# Patient Record
Sex: MALE | Race: BLACK OR AFRICAN AMERICAN | NOT HISPANIC OR LATINO | Employment: FULL TIME | URBAN - METROPOLITAN AREA
[De-identification: names, ages, dates, MRNs, and addresses within clinical notes are randomized per-mention and may not be internally consistent; named-entity substitution may affect disease eponyms.]

---

## 2023-08-15 DIAGNOSIS — I10 HYPERTENSION, UNSPECIFIED TYPE: ICD-10-CM

## 2023-08-19 RX ORDER — LOSARTAN POTASSIUM AND HYDROCHLOROTHIAZIDE 25; 100 MG/1; MG/1
1 TABLET ORAL DAILY
Qty: 90 TABLET | Refills: 2 | Status: SHIPPED | OUTPATIENT
Start: 2023-08-19

## 2024-06-27 ENCOUNTER — PATIENT MESSAGE (OUTPATIENT)
Dept: PRIMARY CARE | Facility: CLINIC | Age: 49
End: 2024-06-27

## 2024-06-27 ENCOUNTER — APPOINTMENT (OUTPATIENT)
Dept: PRIMARY CARE | Facility: CLINIC | Age: 49
End: 2024-06-27
Payer: COMMERCIAL

## 2024-06-27 VITALS
WEIGHT: 315 LBS | TEMPERATURE: 98.2 F | SYSTOLIC BLOOD PRESSURE: 143 MMHG | HEIGHT: 65 IN | DIASTOLIC BLOOD PRESSURE: 91 MMHG | HEART RATE: 88 BPM | BODY MASS INDEX: 52.48 KG/M2

## 2024-06-27 DIAGNOSIS — K21.9 GERD WITHOUT ESOPHAGITIS: ICD-10-CM

## 2024-06-27 DIAGNOSIS — E66.01 CLASS 3 SEVERE OBESITY DUE TO EXCESS CALORIES WITH SERIOUS COMORBIDITY AND BODY MASS INDEX (BMI) OF 50.0 TO 59.9 IN ADULT (MULTI): ICD-10-CM

## 2024-06-27 DIAGNOSIS — N52.9 ERECTILE DYSFUNCTION, UNSPECIFIED ERECTILE DYSFUNCTION TYPE: Primary | ICD-10-CM

## 2024-06-27 DIAGNOSIS — I10 HYPERTENSION, UNSPECIFIED TYPE: ICD-10-CM

## 2024-06-27 DIAGNOSIS — N52.9 ERECTILE DYSFUNCTION, UNSPECIFIED ERECTILE DYSFUNCTION TYPE: ICD-10-CM

## 2024-06-27 DIAGNOSIS — R73.03 PREDIABETES: ICD-10-CM

## 2024-06-27 DIAGNOSIS — Z12.11 ENCOUNTER FOR SCREENING FOR MALIGNANT NEOPLASM OF COLON: ICD-10-CM

## 2024-06-27 DIAGNOSIS — Z00.00 HEALTHCARE MAINTENANCE: Primary | ICD-10-CM

## 2024-06-27 PROBLEM — R29.818 SUSPECTED SLEEP APNEA: Status: RESOLVED | Noted: 2024-06-27 | Resolved: 2024-06-27

## 2024-06-27 LAB — POC HEMOGLOBIN A1C: 5.9 % (ref 4.2–6.5)

## 2024-06-27 PROCEDURE — 3080F DIAST BP >= 90 MM HG: CPT | Performed by: STUDENT IN AN ORGANIZED HEALTH CARE EDUCATION/TRAINING PROGRAM

## 2024-06-27 PROCEDURE — 83036 HEMOGLOBIN GLYCOSYLATED A1C: CPT | Performed by: STUDENT IN AN ORGANIZED HEALTH CARE EDUCATION/TRAINING PROGRAM

## 2024-06-27 PROCEDURE — 99396 PREV VISIT EST AGE 40-64: CPT | Performed by: STUDENT IN AN ORGANIZED HEALTH CARE EDUCATION/TRAINING PROGRAM

## 2024-06-27 PROCEDURE — 1036F TOBACCO NON-USER: CPT | Performed by: STUDENT IN AN ORGANIZED HEALTH CARE EDUCATION/TRAINING PROGRAM

## 2024-06-27 PROCEDURE — 99214 OFFICE O/P EST MOD 30 MIN: CPT | Performed by: STUDENT IN AN ORGANIZED HEALTH CARE EDUCATION/TRAINING PROGRAM

## 2024-06-27 PROCEDURE — 3077F SYST BP >= 140 MM HG: CPT | Performed by: STUDENT IN AN ORGANIZED HEALTH CARE EDUCATION/TRAINING PROGRAM

## 2024-06-27 PROCEDURE — 3008F BODY MASS INDEX DOCD: CPT | Performed by: STUDENT IN AN ORGANIZED HEALTH CARE EDUCATION/TRAINING PROGRAM

## 2024-06-27 RX ORDER — SILDENAFIL 50 MG/1
50 TABLET, FILM COATED ORAL DAILY PRN
Qty: 12 TABLET | Refills: 3 | Status: SHIPPED | OUTPATIENT
Start: 2024-06-27 | End: 2025-06-27

## 2024-06-27 RX ORDER — LOSARTAN POTASSIUM AND HYDROCHLOROTHIAZIDE 25; 100 MG/1; MG/1
1 TABLET ORAL DAILY
Qty: 90 TABLET | Refills: 3 | Status: SHIPPED | OUTPATIENT
Start: 2024-06-27 | End: 2025-06-27

## 2024-06-27 RX ORDER — TADALAFIL 10 MG/1
10 TABLET ORAL DAILY PRN
Qty: 12 TABLET | Refills: 3 | Status: SHIPPED | OUTPATIENT
Start: 2024-06-27 | End: 2025-06-27

## 2024-06-27 RX ORDER — AMLODIPINE BESYLATE 5 MG/1
5 TABLET ORAL DAILY
Qty: 90 TABLET | Refills: 3 | Status: SHIPPED | OUTPATIENT
Start: 2024-06-27 | End: 2025-06-27

## 2024-06-27 NOTE — PATIENT INSTRUCTIONS
Please stop at the lab ([address]) to complete your blood and/or urine work that I've ordered for you.    I will contact you with the results at my soonest convenience. I strongly urge you to use [employer] as this is the quickest and easiest way to access your results and receive my correspondences.    I have added or changed your medications today. See the medication section of this packet for full details.      I recommend that you lose weight via lifestyle modifications such as diet and exercise.     I am referring you to our [facility] to see if you qualify for weight loss medications.      I have ordered a screening colonoscopy. Please call [phone] to schedule the appointment. I recommend being the first appointment of the day, if possible.

## 2024-06-27 NOTE — PROGRESS NOTES
Subjective   Patient ID: Patricia Patton is a 49 y.o. male who presents for No chief complaint on file..    HPI   Hasn't been in for ~2 years. Working for  [employer].    Re: morbid obesity, PreDM - his biggest complaint today. He's been trying to eat better and exercise and he feels that he can't lose weight. Would be interested in weight loss medications if he were to qualify. He is contemplative about bariatric surgery; had some family that had mixed results. Furthermore he is worried about taking time off of work for recovery.     Re: HTN -  BP not at goal. Reports no sx high or low from HTN; denies blurry vision, HA, dizziness LoC CP SoB Jacob and leg swelling     Re: ED - erections not as firm as they used to be. Requesting Viagra.     Re: GERD - longstanding diagnosis, worst at night; position when lying down makes it worse.     PMHx, FHx, Social Hx, Surg Hx personally reviewed at this appointment. No pertinent findings and/or changes from prior (if applicable).    ROS: Denies wt gain/loss f/c HA LoC CP SOB NVDC. See HPI above, and scanned sheet (if applicable). All other systems are reviewed and are without complaint.         Review of Systems    Objective   BP (!) 143/91   Pulse 88   Temp 36.8 °C (98.2 °F)   Wt (!) 151 kg (333 lb)   BMI 53.75 kg/m²     Physical Exam  Gen: morbidly obese, NAD. AAO x3.  HEENT: NC/AT. Anicteric sclera, symmetric pupils. MMM no thrush.  Neck: Soft, supple. No LAD. No goiter.  CV: RRR nl s1s2 no m/r/g  Pulm: CTAB no w/r/r, good air exchange  GI: obese, soft NTND BS+ no hsm  Ext: WWP no edema  Neuro: II-XII grossly intact, nonfocal systemic findings  MSK: 5/5 strength b/l UE and LE  Gait: unremarkable     Lab Results   Component Value Date    WBC 6.3 02/07/2023    HGB 14.8 02/07/2023    HCT 43.9 02/07/2023     02/07/2023    CHOL 150 03/31/2021    TRIG 116 03/31/2021    HDL 40.6 03/31/2021    ALT 33 02/07/2023    AST 26 02/07/2023     02/07/2023    K 3.5  02/07/2023     02/07/2023    CREATININE 1.40 (H) 02/07/2023    BUN 20 02/07/2023    CO2 29 02/07/2023    TSH 1.07 03/31/2021    INR 1.2 (H) 02/01/2022    HGBA1C 5.9 06/27/2024     par    Current Outpatient Medications   Medication Instructions    amLODIPine (NORVASC) 5 mg, oral, Daily    losartan-hydrochlorothiazide (Hyzaar) 100-25 mg tablet 1 tablet, oral, Daily        Assessment/Plan   His obesity is the driving factor behind his medical conditions    # Morbid Obesity: BMI >55 however not a diabetic patient  - [facility] referral for GLP-1 / GIP  pricing and dosing  - routine lab work if not recent  - lifestyle modifications recommended and extensive patient education provided     # Prediabetes: A1C < 6.5  - counselled on risk of developing diabetes  - strongly recommend lifestyle changes such as diet and exercise     # HTN: not at goal  - ambulatory pressures  - routine blood/urine work, if not recent  - lifestyle modifications discussed at length   - add amlodipine to his regimen    # GERD  - continue current medication (PPI or H2 blocker)  - f/u GI as needed     # Erectile Dysfunction  - trial of viagra; 50mg (1/2 tablet to 2 tablets, titrate based on results)  - referral to urology if pt agreeable     # Health Maintenance  - routine blood work  - Colon Cancer Screening: due, ordered today   - PSA: due, ordered today   - Immunizations: declines  - AAA screening:  not indicated

## 2024-08-12 ENCOUNTER — TELEPHONE (OUTPATIENT)
Dept: PRIMARY CARE | Facility: CLINIC | Age: 49
End: 2024-08-12
Payer: COMMERCIAL

## 2024-08-12 NOTE — TELEPHONE ENCOUNTER
Patient call in stating his Sciatic in back and leg pain. Lasha told him to call if he was having pain.

## 2024-12-17 ENCOUNTER — PATIENT MESSAGE (OUTPATIENT)
Dept: PRIMARY CARE | Facility: CLINIC | Age: 49
End: 2024-12-17
Payer: COMMERCIAL

## 2024-12-17 DIAGNOSIS — E66.01 CLASS 3 SEVERE OBESITY DUE TO EXCESS CALORIES WITH SERIOUS COMORBIDITY AND BODY MASS INDEX (BMI) OF 50.0 TO 59.9 IN ADULT: Primary | ICD-10-CM

## 2024-12-17 DIAGNOSIS — E66.813 CLASS 3 SEVERE OBESITY DUE TO EXCESS CALORIES WITH SERIOUS COMORBIDITY AND BODY MASS INDEX (BMI) OF 50.0 TO 59.9 IN ADULT: Primary | ICD-10-CM

## 2024-12-31 ENCOUNTER — APPOINTMENT (OUTPATIENT)
Dept: PHARMACY | Facility: HOSPITAL | Age: 49
End: 2024-12-31
Payer: COMMERCIAL

## 2025-02-19 DIAGNOSIS — N52.9 ERECTILE DYSFUNCTION, UNSPECIFIED ERECTILE DYSFUNCTION TYPE: ICD-10-CM

## 2025-02-20 RX ORDER — SILDENAFIL 50 MG/1
50 TABLET, FILM COATED ORAL DAILY PRN
Qty: 12 TABLET | Refills: 3 | Status: SHIPPED | OUTPATIENT
Start: 2025-02-20 | End: 2026-02-20

## 2025-03-25 ENCOUNTER — OFFICE VISIT (OUTPATIENT)
Dept: URGENT CARE | Age: 50
End: 2025-03-25
Payer: COMMERCIAL

## 2025-03-25 VITALS
RESPIRATION RATE: 17 BRPM | OXYGEN SATURATION: 95 % | HEART RATE: 79 BPM | TEMPERATURE: 98.1 F | SYSTOLIC BLOOD PRESSURE: 118 MMHG | DIASTOLIC BLOOD PRESSURE: 77 MMHG

## 2025-03-25 DIAGNOSIS — R20.0 NUMBNESS OF LEFT ANTERIOR THIGH: ICD-10-CM

## 2025-03-25 DIAGNOSIS — M54.50 LUMBAR PAIN: Primary | ICD-10-CM

## 2025-03-25 PROCEDURE — 3078F DIAST BP <80 MM HG: CPT | Performed by: FAMILY MEDICINE

## 2025-03-25 PROCEDURE — 99213 OFFICE O/P EST LOW 20 MIN: CPT | Performed by: FAMILY MEDICINE

## 2025-03-25 PROCEDURE — 3074F SYST BP LT 130 MM HG: CPT | Performed by: FAMILY MEDICINE

## 2025-03-25 PROCEDURE — 1036F TOBACCO NON-USER: CPT | Performed by: FAMILY MEDICINE

## 2025-03-25 RX ORDER — METHYLPREDNISOLONE 4 MG/1
TABLET ORAL
Qty: 21 TABLET | Refills: 0 | Status: SHIPPED | OUTPATIENT
Start: 2025-03-25 | End: 2025-03-31

## 2025-03-25 ASSESSMENT — PAIN SCALES - GENERAL: PAINLEVEL_OUTOF10: 9

## 2025-03-25 NOTE — PATIENT INSTRUCTIONS
Go to the emergency department for severe symptoms.    Follow-up with your primary care, request for possible physical therapy.      Continue with your weight loss management.    Take the Medrol Dosepak once a day (take all pills for that day as a single dose), with food and a glass of water.  Take it earlier rather than later in the day as discussed.    Acetaminophen 500 mg (Extra StrengthTylenol), 2 tablets every 6 hours as needed for fever or pain.    No Ibuprofen (Advil or Motrin) or Naproxen (Aleve); no decongestants or cold/sinus medicines as these can raise your blood pressure.

## 2025-03-25 NOTE — PROGRESS NOTES
Go to the emergency department for severe symptoms.  Subjective   Patient ID: Patricia Patton is a 50 y.o. male. They present today with a chief complaint of burning pain left thigh (X 3 days ).    History of Present Illness  HPI    50-year-old male with history of hypertension and obesity, here because of burning pain to the left thigh x 3 days.  Pain is felt on the left anterolateral thigh, worse when laying down, standing for prolonged.  This and when he is asleep.  The pain is intermittent.  He has chronic back pain that is ongoing.  He is under weight management and states he has lost a significant amount of weight since he started a few months ago.  No new injury.  No urinary or bowel incontinence.    Patient denies history of diabetes.    Past Medical History  Allergies as of 03/25/2025 - Reviewed 03/25/2025   Allergen Reaction Noted    Amoxicillin Itching 10/12/2017    Sulfa (sulfonamide antibiotics) Hives 12/14/2017       (Not in a hospital admission)       Past Medical History:   Diagnosis Date    Suspected sleep apnea 06/27/2024       Past Surgical History:   Procedure Laterality Date    CHOLECYSTECTOMY  01/19/2018    Cholecystectomy Laparoscopic    VASECTOMY  03/11/2014    Surgery Vas Deferens Vasectomy        reports that he has never smoked. He has never used smokeless tobacco. He reports current alcohol use of about 3.0 - 7.0 standard drinks of alcohol per week. He reports current drug use. Drug: Marijuana.    Review of Systems  Review of Systems                               Objective    Vitals:    03/25/25 1823   BP: 118/77   BP Location: Right arm   Patient Position: Standing   Pulse: 79   Resp: 17   Temp: 36.7 °C (98.1 °F)   SpO2: 95%     No LMP for male patient.    Physical Exam    Constitutional: Vital signs reviewed. Patient alert and without distress.  Obese.    Cardiovascular: Normal pulse normal.  No peripheral edema.    Pulmonary: No respiratory distress.     Skin: No lacerations, abrasions,  bruises or lesions noted. Nails intact without signs of injury.     Neurologic: Cortical function: Normal. Sensation: Normal. Motor: Normal. Coordination: Normal.    Musculoskeletal: LLE    Thoracic and lumbar spine nontender.  There is straightening of the lumbar lordosis.  Paraspinal muscles intact with some palpable spasm bilaterally.  Pelvic bones intact. Normal hip ROM. Negative straight leg raise test.  Femur, quadriceps and hamstrings intact.    No effusion, skin tenting or deformity. Unremarkable opposite side.      Procedures    Point of Care Test & Imaging Results from this visit  No results found for this visit on 03/25/25.   No results found.    Diagnostic study results (if any) were reviewed by Geneva Machado MD.    Assessment/Plan   Allergies, medications, history, and pertinent labs/EKGs/Imaging reviewed by Geneva Machado MD.     Medical Decision Making      Orders and Diagnoses  Diagnoses and all orders for this visit:  Lumbar pain  -     methylPREDNISolone (Medrol Dospak) 4 mg tablets; Follow schedule on package instructions  Numbness of left anterior thigh  -     methylPREDNISolone (Medrol Dospak) 4 mg tablets; Follow schedule on package instructions      Medical Admin Record      Patient disposition: Home    Electronically signed by Geneva Machado MD  10:29 PM

## 2025-07-10 DIAGNOSIS — I10 HYPERTENSION, UNSPECIFIED TYPE: ICD-10-CM

## 2025-07-10 RX ORDER — AMLODIPINE BESYLATE 5 MG/1
5 TABLET ORAL DAILY
Qty: 90 TABLET | Refills: 3 | Status: SHIPPED | OUTPATIENT
Start: 2025-07-10

## 2025-07-11 DIAGNOSIS — I10 HYPERTENSION, UNSPECIFIED TYPE: ICD-10-CM

## 2025-07-11 RX ORDER — LOSARTAN POTASSIUM AND HYDROCHLOROTHIAZIDE 25; 100 MG/1; MG/1
1 TABLET ORAL DAILY
Qty: 90 TABLET | Refills: 3 | OUTPATIENT
Start: 2025-07-11

## 2025-07-24 ENCOUNTER — APPOINTMENT (OUTPATIENT)
Dept: PRIMARY CARE | Facility: CLINIC | Age: 50
End: 2025-07-24
Payer: COMMERCIAL

## 2025-07-24 DIAGNOSIS — I10 HYPERTENSION, UNSPECIFIED TYPE: ICD-10-CM

## 2025-07-24 PROCEDURE — 99213 OFFICE O/P EST LOW 20 MIN: CPT | Performed by: STUDENT IN AN ORGANIZED HEALTH CARE EDUCATION/TRAINING PROGRAM

## 2025-07-24 RX ORDER — LOSARTAN POTASSIUM AND HYDROCHLOROTHIAZIDE 25; 100 MG/1; MG/1
1 TABLET ORAL DAILY
Qty: 90 TABLET | Refills: 3 | Status: SHIPPED | OUTPATIENT
Start: 2025-07-24 | End: 2026-07-24

## 2025-07-24 RX ORDER — AMLODIPINE BESYLATE 5 MG/1
5 TABLET ORAL DAILY
Qty: 90 TABLET | Refills: 3 | Status: SHIPPED | OUTPATIENT
Start: 2025-07-24 | End: 2026-07-24

## 2025-07-24 NOTE — PROGRESS NOTES
Subjective   Patient ID: Patricia Patton is a 50 y.o. male who presents for No chief complaint on file..  History of Present Illness  Patricia Patton is a 50 year old male with hypertension who presents for medication refill.    He seeks a refill for amlodipine and losartan hydrochlorothiazide (Hyzaar). It has been over a year since his last visit. He takes his medications as prescribed. His fiancée monitors his blood pressure at home, which is reportedly within a good range, though he is unsure of the exact numbers.    He has lost weight, decreasing from 337 pounds to 302 pounds, due to increased physical activity and dietary changes, including portion control, meal preparation, and increased fruit intake. He has not experienced any leg swelling.      PMHx, FHx, Social Hx, Surg Hx personally reviewed at this appointment. No pertinent findings and/or changes from prior (if applicable).    ROS: Unless specified above, pt denies wt gain/loss f/c HA LoC CP SOB NVDC. See HPI above, and scanned sheet (if applicable). All other systems are reviewed and are without complaint.     Objective     There were no vitals taken for this visit.     Physical Exam  Gen: Well appearing, AAO x 3.   HEENT: NC/AT. Symmetric pupils on webcam.   Pulm: no evidence of increased work of breathing on webcam  Skin: no blemishes or rashes on webcam     Current Outpatient Medications   Medication Instructions    amLODIPine (NORVASC) 5 mg, oral, Daily    losartan-hydrochlorothiazide (Hyzaar) 100-25 mg tablet 1 tablet, oral, Daily    sildenafil (VIAGRA) 50 mg, oral, Daily PRN    tadalafil (CIALIS) 10 mg, oral, Daily PRN        Lab Results   Component Value Date    WBC 6.3 02/07/2023    HGB 14.8 02/07/2023    HCT 43.9 02/07/2023     02/07/2023    CHOL 150 03/31/2021    TRIG 116 03/31/2021    HDL 40.6 03/31/2021    ALT 33 02/07/2023    AST 26 02/07/2023     02/07/2023    K 3.5 02/07/2023     02/07/2023    CREATININE 1.40 (H)  02/07/2023    BUN 20 02/07/2023    CO2 29 02/07/2023    TSH 1.07 03/31/2021    INR 1.2 (H) 02/01/2022    HGBA1C 5.9 06/27/2024     par     Electrocardiogram 12 Lead  Normal sinus rhythm  Normal ECG  No previous ECGs available  Confirmed by KATHERINE MICHELLE MD ([id number]) on 6/21/2016 7:08:37 AM  CT CHEST PULMONARY EMBOLISM W IV CONTRAST  MRN: [id number]  Patient Name: GAGE WEBSTER     STUDY:  CT ANGIO CHEST FOR PE;  2/8/2023 2:56 am     INDICATION:  chest pain, elevated dimer .     COMPARISON:  02/01/2022     ACCESSION NUMBER(S):  [id number]     ORDERING CLINICIAN:  ANGELICA GRANADO     TECHNIQUE:  Contiguous axial images of the chest were obtained after the  intravenous administration of 70 mL Omnipaque 350 using angiographic  PE protocol. Coronal and sagittal reformatted images were  reconstructed from the axial data. MIP images were created and  reviewed.     FINDINGS:  MEDIASTINUM AND LYMPH NODES: No enlarged intrathoracic or axillary  lymph nodes.  No pneumomediastinum.     VESSELS:  Normal caliber aorta without significant aortic  atherosclerosis.  No pulmonary embolism seen to the segmental level.     HEART: Normal in size.  No significant coronary artery  calcifications. No significant pericardial effusion.     LUNG, AIRWAYS, AND PLEURA: Central airways are patent. No focal  consolidation, pleural effusion or sizable pneumothorax seen.     OSSEOUS STRUCTURES/CHEST WALL:  No acute osseous abnormality.     UPPER ABDOMEN/OTHER: Right renal cyst measuring up to 2.4 cm.  Redemonstrated cystic region extending from the spleen measuring up  to 4.6 cm with adjacent calcifications similar to prior imaging. No  acute abnormality identified in the upper abdomen.     IMPRESSION:  No acute pulmonary embolism or other acute cardiopulmonary process.     Splenic cystic lesion similar prior imaging.                  Assessment & Plan  Hypertension  Chronic hypertension, well-controlled with amlodipine and losartan hydrochlorothiazide. Home  readings normal.  - Renew amlodipine 5 mg and losartan hydrochlorothiazide 100/25 mg prescriptions with 90 tablets and 3 refills.  - Order CBC and CMP to monitor renal function.    Obesity  Weight loss of 35 pounds achieved through lifestyle modifications.  - Encourage continued weight loss through exercise and dietary changes.          Lasha Valdez MD       This medical note was created with the assistance of artificial intelligence (AI) for documentation purposes. The content has been reviewed and confirmed by the healthcare provider for accuracy and completeness. Patient consented to the use of audio recording and use of AI during their visit.